# Patient Record
Sex: FEMALE | Race: WHITE | URBAN - NONMETROPOLITAN AREA
[De-identification: names, ages, dates, MRNs, and addresses within clinical notes are randomized per-mention and may not be internally consistent; named-entity substitution may affect disease eponyms.]

---

## 2017-10-30 RX ORDER — METHYLPREDNISOLONE 4 MG/1
TABLET ORAL
Qty: 1 KIT | Refills: 0 | Status: SHIPPED | OUTPATIENT
Start: 2017-10-30 | End: 2017-11-05

## 2017-10-30 RX ORDER — AZITHROMYCIN 250 MG/1
TABLET, FILM COATED ORAL
Qty: 1 PACKET | Refills: 0 | Status: SHIPPED | OUTPATIENT
Start: 2017-10-30 | End: 2017-11-09

## 2020-12-14 ENCOUNTER — TELEPHONE (OUTPATIENT)
Dept: OBGYN CLINIC | Age: 50
End: 2020-12-14

## 2020-12-14 RX ORDER — BUPROPION HYDROCHLORIDE 300 MG/1
300 TABLET ORAL EVERY MORNING
Qty: 30 TABLET | Refills: 2 | Status: SHIPPED | OUTPATIENT
Start: 2020-12-14 | End: 2021-02-17 | Stop reason: SDUPTHER

## 2020-12-14 RX ORDER — ESTRADIOL 1 MG/1
1 TABLET ORAL DAILY
Qty: 30 TABLET | Refills: 2 | Status: SHIPPED | OUTPATIENT
Start: 2020-12-14 | End: 2021-02-17 | Stop reason: SDUPTHER

## 2020-12-14 NOTE — TELEPHONE ENCOUNTER
Patient called and needed refills of her estradiol and Wellbutrin. I explained that she is overdue for an annual appt and that we need to schedule that. She did schedule for 12/17. She is needing Estradiol 1mg and Wellbutrin XL  300mg to [facility] on [facility]. Ok to refill?

## 2021-01-05 VITALS — HEIGHT: 63 IN

## 2021-01-05 RX ORDER — CHOLECALCIFEROL (VITAMIN D3) 1250 MCG
CAPSULE ORAL
COMMUNITY

## 2021-01-05 RX ORDER — LEVOTHYROXINE SODIUM 0.1 MG/1
100 TABLET ORAL DAILY
COMMUNITY

## 2021-01-05 RX ORDER — HYDROCHLOROTHIAZIDE 12.5 MG/1
25 CAPSULE, GELATIN COATED ORAL DAILY
COMMUNITY

## 2021-02-17 ENCOUNTER — OFFICE VISIT (OUTPATIENT)
Dept: OBGYN CLINIC | Age: 51
End: 2021-02-17
Payer: COMMERCIAL

## 2021-02-17 VITALS
HEIGHT: 63 IN | SYSTOLIC BLOOD PRESSURE: 140 MMHG | WEIGHT: 249 LBS | BODY MASS INDEX: 44.12 KG/M2 | DIASTOLIC BLOOD PRESSURE: 98 MMHG

## 2021-02-17 DIAGNOSIS — Z01.419 WOMEN'S ANNUAL ROUTINE GYNECOLOGICAL EXAMINATION: Primary | ICD-10-CM

## 2021-02-17 PROCEDURE — 99396 PREV VISIT EST AGE 40-64: CPT | Performed by: OBSTETRICS & GYNECOLOGY

## 2021-02-17 RX ORDER — FLUOXETINE HYDROCHLORIDE 20 MG/1
CAPSULE ORAL
COMMUNITY
Start: 2021-01-26

## 2021-02-17 RX ORDER — ESTRADIOL 1 MG/1
1 TABLET ORAL DAILY
Qty: 90 TABLET | Refills: 3 | Status: SHIPPED | OUTPATIENT
Start: 2021-02-17

## 2021-02-17 RX ORDER — TRAMADOL HYDROCHLORIDE 50 MG/1
TABLET ORAL
COMMUNITY
Start: 2021-01-26

## 2021-02-17 RX ORDER — BUPROPION HYDROCHLORIDE 300 MG/1
300 TABLET ORAL EVERY MORNING
Qty: 90 TABLET | Refills: 3 | Status: SHIPPED | OUTPATIENT
Start: 2021-02-17

## 2021-02-17 RX ORDER — FAMOTIDINE 40 MG/1
TABLET, FILM COATED ORAL
COMMUNITY
Start: 2021-01-26

## 2021-02-17 ASSESSMENT — ENCOUNTER SYMPTOMS
ABDOMINAL PAIN: 0
CONSTIPATION: 0
DIARRHEA: 0
SHORTNESS OF BREATH: 0

## 2021-02-17 NOTE — PROGRESS NOTES
DATE OF VISIT:  21  PATIENT NAME:  Evaristo Gomez     YOB: 1970    53 y.o. Chief Complaint   Patient presents with    Annual Exam     last pap  NL/NEG. last mamm . Pt. recently saw her PCP and was started on Prozac. She was having dreams about dying and finding ways to kill herself. Then she started thinking of ways to kill herself while awake, and searchig for places along the road where she could drive off. PCP did depression screening which she failed         No LMP recorded. Patient has had a hysterectomy. Primary Care Physician: Neyda Tang    The patient was seen and examined. She has no chiefcomplaint today and is here for her annual exam.  Her bowels are regular. There are no voiding complaints. She denies any bloating. She denies vaginal discharge and was counseled on STD's and the need for barriercontraception. HPI : Evaristo Gomez is a 53 y.o. female  who presents today for her annual.  Her PCP has started her on Prozac bc she failed depression screen.     ______________________________________________________________________    OB History    Para Term  AB Living   2 2 2     2   SAB TAB Ectopic Molar Multiple Live Births             2      # Outcome Date GA Lbr Tucker/2nd Weight Sex Delivery Anes PTL Lv   2 Term 96    M CS-Unspec Spinal  MEJIA   1 Term 90    M CS-Unspec Spinal  MEJIA      Complications: Failure to Progress in First Stage, Toxemia in pregnancy     Past Medical History:   Diagnosis Date    Abnormal Pap smear of cervix     Depression     Diabetes mellitus ([address]     Gastric reflux     Irritable bowel syndrome     Thyroid disease                                                                    Past Surgical History:   Procedure Laterality Date    APPENDECTOMY       SECTION  ,     HERNIA REPAIR      HYSTERECTOMY      HYSTERECTOMY, TOTAL ABDOMINAL      age 36, ovary removed  HYSTEROSCOPY      OVARY REMOVAL      TONSILLECTOMY       Family History   Problem Relation Age of Onset    Heart Disease Paternal Grandmother     Rheum Arthritis Maternal Grandmother     Uterine Cancer Mother     Breast Cancer Paternal Aunt     Stomach Cancer Paternal Uncle      Social History     Socioeconomic History    Marital status:      Spouse name: Not on file    Number of children: Not on file    Years of education: Not on file    Highest education level: Not on file   Occupational History    Not on file   Social Needs    Financial resource strain: Not on file    Food insecurity     Worry: Not on file     Inability: Not on file    Transportation needs     Medical: Not on file     Non-medical: Not on file   Tobacco Use    Smoking status: Never Smoker    Smokeless tobacco: Never Used   Substance and Sexual Activity    Alcohol use: Yes    Drug use: Never    Sexual activity: Not on file   Lifestyle    Physical activity     Days per week: Not on file     Minutes per session: Not on file    Stress: Not on file   Relationships    Social connections     Talks on phone: Not on file     Gets together: Not on file     Attends [employer] service: Not on file     Active member of club or organization: Not on file     Attends meetings of clubs or organizations: Not on file     Relationship status: Not on file    Intimate partner violence     Fear of current or ex partner: Not on file     Emotionally abused: Not on file     Physically abused: Not on file     Forced sexual activity: Not on file   Other Topics Concern    Not on file   Social History Narrative    Not on file     Vitals:    02/17/21 1113   BP: (!) 140/98   Site: Right Upper Arm   Position: Sitting   Weight: 249 lb (112.9 kg)   Height: 5' 2.5\" (1.588 m)     Body mass index is 44.82 kg/m². No LMP recorded. Patient has had a hysterectomy.     MEDICATIONS:  Current Outpatient Medications   Medication Sig Dispense Refill  famotidine (PEPCID) 40 MG tablet       FLUoxetine (PROZAC) 20 MG capsule       traMADol (ULTRAM) 50 MG tablet       Cholecalciferol (VITAMIN D3) 1.25 MG (65516 UT) CAPS Take by mouth      hydroCHLOROthiazide (MICROZIDE) 12.5 MG capsule Take 25 mg by mouth daily       levothyroxine (SYNTHROID) 100 MCG tablet Take 100 mcg by mouth Daily      metFORMIN (GLUCOPHAGE) 500 MG tablet Take 500 mg by mouth 2 times daily (with meals)      estradiol (ESTRACE) 1 MG tablet Take 1 tablet by mouth daily 30 tablet 2    buPROPion (WELLBUTRIN XL) 300 MG extended release tablet Take 1 tablet by mouth every morning 30 tablet 2     No current facility-administered medications for this visit. ALLERGIES:  Allergies as of 02/17/2021 - Review Complete 02/17/2021   Allergen Reaction Noted    Aspirin  01/05/2021    Sulfa antibiotics  01/05/2021    Byetta 10 mcg pen [exenatide] Rash 01/05/2021    Tanzeum [albiglutide] Rash 01/05/2021           Symptoms of decreased mood present - being treated by PCP    **If either question is answered in a  positive fashion then completethe PHQ9 Scoring Evaluation and make the appropriate referral**      Gynecologic History:       No LMP recorded. Patient has had a hysterectomy. Sexually Active: Yes    STD History: No     Permanent Sterilization:Yes hyst   Reversible Birth Control: No        Hormone Replacement Exposure: Yes estrace      Genetic Qualified Family History of Breast, Ovarian , Colon or Uterine Cancer:No   If YES see scanned worksheet. Preventative Health Testing:  Colposcopy History:   Date of Last Mammogram: 2015 - aware that she is due and can call [employer]  Date of Last Colonoscopy:   Date of Last Bone Density:      ______________________________________________________________________    REVIEW OF SYSTEMS:       Review of Systems   Constitutional: Negative for chills, fatigue and fever. Respiratory: Negative for shortness of breath. Cardiovascular: Negative for chest pain. Gastrointestinal: Negative for abdominal pain, constipation and diarrhea. Genitourinary: Negative for dysuria, enuresis, frequency, menstrual problem, pelvic pain, urgency and vaginal bleeding. Musculoskeletal:        Injured right knee - having mri; not able to exercise - wakes her from sleep   Neurological: Negative for dizziness, light-headedness and headaches. Psychiatric/Behavioral:        Being treated by PCP for depression - is doing much better since starting prozac; hx of night terrors but nothing like this before       PHYSICAL EXAM:    Physical Exam  Constitutional:       Appearance: Normal appearance. Genitourinary:      Pelvic exam was performed with patient in the lithotomy position. Vulva and vagina normal.      Cervix is absent. Uterus is absent. Right adnexa absent. Left adnexa absent. HENT:      Head: Atraumatic. Mouth/Throat:      Mouth: Mucous membranes are moist.   Eyes:      Extraocular Movements: Extraocular movements intact. Pupils: Pupils are equal, round, and reactive to light. Neck:      Musculoskeletal: Normal range of motion. Cardiovascular:      Rate and Rhythm: Normal rate. Pulmonary:      Effort: Pulmonary effort is normal.   Chest:      Breasts:         Right: Normal.         Left: Normal.   Abdominal:      General: There is no distension. Palpations: Abdomen is soft. Tenderness: There is no abdominal tenderness. Musculoskeletal: Normal range of motion. Neurological:      Mental Status: She is alert and oriented to person, place, and time. Skin:     General: Skin is warm and dry. Psychiatric:         Mood and Affect: Mood normal.         Behavior: Behavior normal.         Thought Content: Thought content normal.         Judgment: Judgment normal.   Chaperone present: chaperone declined. POC Cultures:  No results found for this visit on 02/17/21.       ASSESSMENT: 53 y.o. Annual  1. Women's annual routine gynecological examination          There are no active problems to display for this patient. Hereditary Breast, Ovarian, Colon and Uterine Cancer screening Done. Tobacco & Secondary smoke risks reviewed; instructed on cessation and avoidance    PLAN:    Return in about 1 year (around 2/17/2022) for annual.  No orders of the defined types were placed in this encounter. Repeat Annual every 1 year  Cervical Cytology Evaluation begins at 24years old. If Negative Cytology, Follow-up screening per current guidelines. Mammograms every 1year. If 35 yo and last mammogram was negative. Calcium and Vitamin D dosing reviewed. Colonoscopy screening reviewed as well as onset for bone density testing. Birth control and barrier recommendationsdiscussed. STD counseling and prevention reviewed. Gardisil counseling completed for all patients 7-33 yo. Routine healthmaintenance per patients PCP.     Electronicallysigned by:  Cresencio Edmondson DO on 02/17/21

## 2022-03-17 RX ORDER — ESTRADIOL 1 MG/1
TABLET ORAL
Qty: 90 TABLET | Refills: 0 | OUTPATIENT
Start: 2022-03-17